# Patient Record
Sex: MALE | Race: WHITE | Employment: UNEMPLOYED | URBAN - METROPOLITAN AREA
[De-identification: names, ages, dates, MRNs, and addresses within clinical notes are randomized per-mention and may not be internally consistent; named-entity substitution may affect disease eponyms.]

---

## 2019-12-04 PROCEDURE — 96372 THER/PROPH/DIAG INJ SC/IM: CPT | Performed by: EMERGENCY MEDICINE

## 2019-12-04 PROCEDURE — 99284 EMERGENCY DEPT VISIT MOD MDM: CPT | Mod: Z6 | Performed by: EMERGENCY MEDICINE

## 2019-12-04 PROCEDURE — 99284 EMERGENCY DEPT VISIT MOD MDM: CPT | Mod: 25 | Performed by: EMERGENCY MEDICINE

## 2019-12-05 ENCOUNTER — APPOINTMENT (OUTPATIENT)
Dept: ULTRASOUND IMAGING | Facility: CLINIC | Age: 24
End: 2019-12-05
Attending: EMERGENCY MEDICINE

## 2019-12-05 ENCOUNTER — HOSPITAL ENCOUNTER (EMERGENCY)
Facility: CLINIC | Age: 24
Discharge: HOME | End: 2019-12-05
Attending: EMERGENCY MEDICINE | Admitting: EMERGENCY MEDICINE

## 2019-12-05 VITALS
RESPIRATION RATE: 15 BRPM | TEMPERATURE: 98.1 F | SYSTOLIC BLOOD PRESSURE: 102 MMHG | OXYGEN SATURATION: 98 % | DIASTOLIC BLOOD PRESSURE: 61 MMHG | HEART RATE: 74 BPM

## 2019-12-05 DIAGNOSIS — N45.3 EPIDIDYMOORCHITIS: ICD-10-CM

## 2019-12-05 LAB
ALBUMIN UR-MCNC: 30 MG/DL
APPEARANCE UR: ABNORMAL
BACTERIA #/AREA URNS HPF: ABNORMAL /HPF
BILIRUB UR QL STRIP: NEGATIVE
COLOR UR AUTO: YELLOW
GLUCOSE UR STRIP-MCNC: NEGATIVE MG/DL
HGB UR QL STRIP: ABNORMAL
KETONES UR STRIP-MCNC: NEGATIVE MG/DL
LEUKOCYTE ESTERASE UR QL STRIP: ABNORMAL
MUCOUS THREADS #/AREA URNS LPF: PRESENT /LPF
NITRATE UR QL: NEGATIVE
PH UR STRIP: 6 PH (ref 5–7)
RBC #/AREA URNS AUTO: 29 /HPF (ref 0–2)
SOURCE: ABNORMAL
SP GR UR STRIP: 1.03 (ref 1–1.03)
TRANS CELLS #/AREA URNS HPF: 2 /HPF (ref 0–1)
UROBILINOGEN UR STRIP-MCNC: 2 MG/DL (ref 0–2)
WBC #/AREA URNS AUTO: >182 /HPF (ref 0–5)
WBC CLUMPS #/AREA URNS HPF: PRESENT /HPF

## 2019-12-05 PROCEDURE — 25000125 ZZHC RX 250: Performed by: EMERGENCY MEDICINE

## 2019-12-05 PROCEDURE — 87491 CHLMYD TRACH DNA AMP PROBE: CPT | Performed by: EMERGENCY MEDICINE

## 2019-12-05 PROCEDURE — 87591 N.GONORRHOEAE DNA AMP PROB: CPT | Performed by: EMERGENCY MEDICINE

## 2019-12-05 PROCEDURE — 25000128 H RX IP 250 OP 636: Performed by: EMERGENCY MEDICINE

## 2019-12-05 PROCEDURE — 87086 URINE CULTURE/COLONY COUNT: CPT | Performed by: EMERGENCY MEDICINE

## 2019-12-05 PROCEDURE — 25000132 ZZH RX MED GY IP 250 OP 250 PS 637: Performed by: EMERGENCY MEDICINE

## 2019-12-05 PROCEDURE — 81001 URINALYSIS AUTO W/SCOPE: CPT | Performed by: EMERGENCY MEDICINE

## 2019-12-05 PROCEDURE — 93976 VASCULAR STUDY: CPT

## 2019-12-05 RX ORDER — IBUPROFEN 600 MG/1
600 TABLET, FILM COATED ORAL ONCE
Status: COMPLETED | OUTPATIENT
Start: 2019-12-05 | End: 2019-12-05

## 2019-12-05 RX ORDER — DOXYCYCLINE 100 MG/1
100 CAPSULE ORAL 2 TIMES DAILY
Qty: 20 CAPSULE | Refills: 0 | Status: SHIPPED | OUTPATIENT
Start: 2019-12-05 | End: 2019-12-15

## 2019-12-05 RX ADMIN — IBUPROFEN 600 MG: 600 TABLET ORAL at 02:38

## 2019-12-05 RX ADMIN — LIDOCAINE HYDROCHLORIDE 250 MG: 10 INJECTION, SOLUTION EPIDURAL; INFILTRATION; INTRACAUDAL; PERINEURAL at 03:01

## 2019-12-05 ASSESSMENT — ENCOUNTER SYMPTOMS
CHILLS: 0
NAUSEA: 0
DIFFICULTY URINATING: 1
VOMITING: 0
DIARRHEA: 0
FEVER: 0

## 2019-12-05 NOTE — ED PROVIDER NOTES
"  History     Chief Complaint   Patient presents with     Groin Swelling     Reports 2 days of left testicular pain and swelling, \"and before that there was weird discharge.\" Reports pain has been increasing gradually.      SUZE Jason is a 24 year old male who presents to the Emergency Department for evaluation of testicular pain with scrotal swelling. Patient reports he initially developed penile discharge within the last two days along with difficulty voiding completely when urinating. Today around 17:30, he had sudden onset of left scrotal swelling and left testicular pain. He describes feeling a pulling sensation. He notes that the discomfort is now radiating to his lower abdomen. He has never had symptoms of this nature. He denies any precipitating factors including trauma or injury to the area. He denies any fevers, chills, skin discoloration, dysuria, nausea, vomiting or diarrhea. He denies any previous surgeries. He is sexually active with one partner and uses protection. Patient is sexually active with female partners.     I have reviewed the Medications, Allergies, Past Medical and Surgical History, and Social History in the [facility] system.    Past Medical History:   Diagnosis Date     Uncomplicated asthma        No past surgical history on file.    No family history on file.    Social History     Tobacco Use     Smoking status: Never Smoker     Smokeless tobacco: Never Used   Substance Use Topics     Alcohol use: Not on file     Current Facility-Administered Medications   Medication     ibuprofen (ADVIL/MOTRIN) tablet 600 mg     Current Outpatient Medications   Medication     albuterol (PROAIR HFA, PROVENTIL HFA, VENTOLIN HFA) 108 (90 BASE) MCG/ACT inhaler     mometasone (ASMANEX) 110 MCG/INH inhaler     Facility-Administered Medications Ordered in Other Encounters   Medication     Self Administer Medications: Behavioral Services        Allergies   Allergen Reactions     Shellfish-Derived Products "        Review of Systems   Constitutional: Negative for chills and fever.   Gastrointestinal: Negative for diarrhea, nausea and vomiting.   Genitourinary: Positive for difficulty urinating, discharge, scrotal swelling (left) and testicular pain (left).   All other systems reviewed and are negative.      Physical Exam   BP: 114/62  Pulse: 72  Temp: 97.2  F (36.2  C)  Resp: 16  SpO2: 99 %      Physical Exam  Physical Exam   Constitutional: oriented to person, place, and time. appears well-developed and well-nourished.   HENT:   Head: Normocephalic and atraumatic.   Neck: Normal range of motion.   Pulmonary/Chest: Effort normal. No respiratory distress.   Cardiac: No murmurs, rubs, gallops. RRR.  Abdominal: Abdomen soft, nontender, nondistended. No rebound tenderness.  MSK: Long bones without deformity or evidence of trauma  Neurological: alert and oriented to person, place, and time.   Skin: Skin is warm and dry.   Psychiatric:  normal mood and affect.  behavior is normal. Thought content normal.   : Left testicle mildly swollen compared to the right, diffusely tender to palpation.  Intact cremaster reflex bilaterally.  No discharge noted at the penis.  Normal-appearing lie the testicles.  ED Course        Procedures        Results for orders placed or performed during the hospital encounter of 12/05/19   UA reflex to Microscopic and Culture     Status: Abnormal   Result Value Ref Range    Color Urine Yellow     Appearance Urine Slightly Cloudy     Glucose Urine Negative NEG^Negative mg/dL    Bilirubin Urine Negative NEG^Negative    Ketones Urine Negative NEG^Negative mg/dL    Specific Gravity Urine 1.029 1.003 - 1.035    Blood Urine Small (A) NEG^Negative    pH Urine 6.0 5.0 - 7.0 pH    Protein Albumin Urine 30 (A) NEG^Negative mg/dL    Urobilinogen mg/dL 2.0 0.0 - 2.0 mg/dL    Nitrite Urine Negative NEG^Negative    Leukocyte Esterase Urine Large (A) NEG^Negative    Source Midstream Urine     RBC Urine 29 (H) 0 - 2  /HPF    WBC Urine >182 (H) 0 - 5 /HPF    WBC Clumps Present (A) NEG^Negative /HPF    Bacteria Urine Few (A) NEG^Negative /HPF    Transitional Epi 2 (H) 0 - 1 /HPF    Mucous Urine Present (A) NEG^Negative /LPF       Labs Ordered and Resulted from Time of ED Arrival Up to the Time of Departure from the ED - No data to display         Assessments & Plan (with Medical Decision Making)   MDM  Patient presenting with testicular pain.  Ultrasound concerning for epididymal orchitis which would fit his clinical picture.  Patient is sexually active with females, does sound when he uses a condom but he has had some discharge as well.  We will treat as STI with ceftriaxone once here in the emergency department addition to doxycycline.  He will be given referral to urology in a week, he will return if symptoms are worsening.  Ultrasound shows no findings of torsion or other abnormality.  Patient agrees with this plan will return if symptoms are worsening.    I have reviewed the nursing notes.    I have reviewed the findings, diagnosis, plan and need for follow up with the patient.    New Prescriptions    DOXYCYCLINE HYCLATE (VIBRAMYCIN) 100 MG CAPSULE    Take 1 capsule (100 mg) by mouth 2 times daily for 10 days       Final diagnoses:   Epididymoorchitis     IReina, am serving as a trained medical scribe to document services personally performed by Naun Landeros MD, based on the provider's statements to me.   INaun MD, was physically present and have reviewed and verified the accuracy of this note documented by Reina France.    12/4/2019   [facility], EMERGENCY DEPARTMENT     Naun Landeros MD  12/05/19 0337

## 2019-12-05 NOTE — RESULT ENCOUNTER NOTE
Emergency Dept/Urgent Care discharge antibiotic (if prescribed): Doxycycline 100 mg PO tablet, 1 tablet (100 mg) by mouth 2 times daily for 10 days  Date of Rx (if applicable):  12/5/19  No changes in treatment per Urine culture protocol.

## 2019-12-05 NOTE — ED AVS SNAPSHOT
[facility], [place], Emergency Department  [address]  [place] [address]  Phone:  [phone]  Fax:  [phone]                                    Dale Jason   MRN: [id number]    Department:  [facility], Emergency Department   Date of Visit:  12/4/2019           After Visit Summary Signature Page    I have received my discharge instructions, and my questions have been answered. I have discussed any challenges I see with this plan with the nurse or doctor.    ..........................................................................................................................................  Patient/Patient Representative Signature      ..........................................................................................................................................  Patient Representative Print Name and Relationship to Patient    ..................................................               ................................................  Date                                   Time    ..........................................................................................................................................  Reviewed by Signature/Title    ...................................................              ..............................................  Date                                               Time          22EPIC Rev 08/18

## 2019-12-05 NOTE — DISCHARGE INSTRUCTIONS
Please make an appointment to follow up with Urology Clinic (phone: [phone]) in 7 days even if entirely better.    If Marking has discomfort from fever or other pain, he can have:  Acetaminophen (Tylenol) every 6 hours as needed. His dose is:    2 regular strength tabs (650 mg)                                     (43.2+ kg/96+ lb)    NOTE: If your acetaminophen (Tylenol) came with a dropper marked with 0.4 and 0.8 ml, call us ([phone]) or check with your doctor about the dose before using it.     AND/OR      Ibuprofen (Advil, Motrin) every 6 hours as needed. His/her dose is:    2 regular strength tabs (400 mg)                                                                         (40-60 kg/ lb)

## 2019-12-06 ENCOUNTER — TELEPHONE (OUTPATIENT)
Dept: EMERGENCY MEDICINE | Facility: CLINIC | Age: 24
End: 2019-12-06

## 2019-12-06 LAB
BACTERIA SPEC CULT: NO GROWTH
C TRACH DNA SPEC QL NAA+PROBE: POSITIVE
Lab: NORMAL
N GONORRHOEA DNA SPEC QL NAA+PROBE: NEGATIVE
SPECIMEN SOURCE: ABNORMAL
SPECIMEN SOURCE: NORMAL
SPECIMEN SOURCE: NORMAL

## 2019-12-06 NOTE — RESULT ENCOUNTER NOTE
Final urine culture report is NEGATIVE per [place] ED Lab Result protocol.    If NEGATIVE result, no change in treatment, per [place] ED Lab Result protocol.

## 2019-12-06 NOTE — TELEPHONE ENCOUNTER
[employer] [facility] Emergency Department Lab result notification:    [place] ED lab result protocol used  Chlamydia Protocol    Reason for call  Notify of lab results, assess symptoms,  review ED providers recommendations/discharge instructions (if necessary) and advise per ED lab result f/u protocol    Lab Result   Final N. Gonorrhoeae PCR is [NEGATIVE] AND Chlamydia T PCR is [POSITIVE]  Patient was treated for N. Gonorrhea and Chlamydia T in the ED  [Yes or No]:  Yes       If Yes, list what was given in the ED (dual treatment for N Gonorrhea):  Rocephin 250 mg IM x 1 & Doxycycline 100 mg PO tablet, 1 tablet (100 mg) by mouth 2 times daily for 10 days  If treated appropriately in the [place] ED, notify patient of result and STD instructions.    Information table from ED Provider visit on 12/5/19  Symptoms reported at ED visit (Chief complaint, HPI) Dale Jason is a 24 year old male who presents to the Emergency Department for evaluation of testicular pain with scrotal swelling. Patient reports he initially developed penile discharge within the last two days along with difficulty voiding completely when urinating. Today around 17:30, he had sudden onset of left scrotal swelling and left testicular pain. He describes feeling a pulling sensation. He notes that the discomfort is now radiating to his lower abdomen. He has never had symptoms of this nature. He denies any precipitating factors including trauma or injury to the area. He denies any fevers, chills, skin discoloration, dysuria, nausea, vomiting or diarrhea. He denies any previous surgeries. He is sexually active with one partner and uses protection. Patient is sexually active with female partners   ED providers Impression and Plan (applicable information) Patient presenting with testicular pain.  Ultrasound concerning for epididymal orchitis which would fit his clinical picture.  Patient is sexually active with females, does sound when he uses a condom  but he has had some discharge as well.  We will treat as STI with ceftriaxone once here in the emergency department addition to doxycycline.  He will be given referral to urology in a week, he will return if symptoms are worsening.  Ultrasound shows no findings of torsion or other abnormality.  Patient agrees with this plan will return if symptoms are worsening.   Miscellaneous information Gonorrhea: negative     RN Assessment (Patient s current Symptoms), include time called.  [Insert Left message here if message left]  Msg left with mom.  Did not disclose any info but  Mom did just now p/u scripts for Marking per her report.  Will have Marking call this nurse back.       RN Recommendations/Instructions per [place] ED lab result protocol  STD Patient Instructions:    We recommend that you contact any recent sexual partners within the last 2 months and have them evaluated by a physician.    Avoid sexual activity for 7 to 10 days or until both your and your partner(s) have completed all antibiotic medications.    We advise that you consider following up with your PCP at approximately 3 months for retesting to be sure the infection has cleared.      Please Contact your PCP clinic or return to the Emergency department if your:    Symptoms return.    Symptoms do not improve after 3 days on antibiotic.    Symptoms do not resolve after completing antibiotic.    Symptoms worsen or other concerning symptom's.    PCP follow-up Questions asked: YES       Gabrielle Santos RN    [employer] [place]   Lung Nodule and ED Lab Results F/U RN  Epic pool (ED late result f/u RN) : [id number]   # [phone]    Copy of Lab result   Contains abnormal data Chlamydia trachomatis PCR   Order: [id number]   Status:  Final result   Visible to patient:  No (Not Released) Next appt:  None Dx:  Epididymoorchitis   Specimen Information: Urine         Ref Range & Units 1d ago  (12/5/19) 1d ago  (12/5/19)   Specimen Description  Urine  Midstream  Urine    Chlamydia Trachomatis PCR NEG^Negative PositiveAbnormal

## 2021-06-25 ENCOUNTER — VIRTUAL VISIT (OUTPATIENT)
Dept: FAMILY MEDICINE | Facility: CLINIC | Age: 26
End: 2021-06-25
Payer: COMMERCIAL

## 2021-06-25 DIAGNOSIS — Z11.1 SCREENING EXAMINATION FOR PULMONARY TUBERCULOSIS: Primary | ICD-10-CM

## 2021-06-25 RX ORDER — GABAPENTIN 400 MG/1
400 CAPSULE ORAL 3 TIMES DAILY
COMMUNITY

## 2021-06-25 RX ORDER — BUPRENORPHINE AND NALOXONE 8; 2 MG/1; MG/1
2 FILM, SOLUBLE BUCCAL; SUBLINGUAL DAILY
COMMUNITY
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
## 2021-06-29 DIAGNOSIS — Z11.1 SCREENING EXAMINATION FOR PULMONARY TUBERCULOSIS: ICD-10-CM

## 2021-07-01 LAB
GAMMA INTERFERON BACKGROUND BLD IA-ACNC: 0.03 IU/ML
M TB IFN-G CD4+ BCKGRND COR BLD-ACNC: 9.97 IU/ML
M TB TUBERC IFN-G BLD QL: NEGATIVE
MITOGEN IGNF BCKGRD COR BLD-ACNC: 0 IU/ML
MITOGEN IGNF BCKGRD COR BLD-ACNC: 0.01 IU/ML